# Patient Record
(demographics unavailable — no encounter records)

---

## 2025-03-19 NOTE — HISTORY OF PRESENT ILLNESS
[FreeTextEntry8] : 45 year old male new patient here to establish care. Prior PCP no longer in network. History of diabetes, hyperlipidemia, Afib s/p cardioablation with return to sinus rhythm.   DM - Last A1c 6.4% in 8/2024. Taking metformin 500mg daily and Wegovy 2.4mg weekly.  States his insurance has changed coverage for Wegovy and now tier 3 medication. Requesting medication change to Mounjaro since lower tier insurance coverage. Last Wegovy dose was 1 week ago.  Reports he recently had CT imaging notable for smaller pancreas than normal due to missing body of pancreas.   HLD - Last LDL 34 in 5/2024. Taking atorvastatin 10mg daily.  History of Afib s/p cardioablation. Following with [facility] Dr. Ortega Aranda. He is also on baby aspirin daily but will discuss with cardio if still medically indicated. No history of CVA or MI.  had CT heart recently with no atherosclerosis.  Has apple watch to monitor for Afib.

## 2025-03-19 NOTE — HEALTH RISK ASSESSMENT
[0] : 2) Feeling down, depressed, or hopeless: Not at all (0) [PHQ-2 Negative - No further assessment needed] : PHQ-2 Negative - No further assessment needed [Never] : Never [[id number]] : 0

## 2025-03-19 NOTE — ASSESSMENT
[FreeTextEntry1] : Labs drawn in office Will switch Wegovy to Mounjaro given insurance coverage. F/u 3-4 months.  CRC screening later this year

## 2025-06-20 NOTE — HISTORY OF PRESENT ILLNESS
[de-identified] : 45-year-old male virtual visit to discuss diabetes and weight management. He is currently on metformin 500 mg twice daily and Mounjaro 5 mg weekly. Last A1c 5.8% which is significant improvement compared to May 2024 with A1c 6.5%. Weight in office last visit 215 pounds with BMI 29.1 which has been decreasing since last year with BMI 31.  Reports while waiting for the Mounjaro to be approved he gained weight to 225 pounds.  Since taking medications reports his weight down to 218 pounds.  He is interested in increasing Mounjaro dosage.  Has minimal reflux symptoms.  Denies any hypolycemic symptoms, abdominal pain, constipation.